# Patient Record
Sex: FEMALE | Race: OTHER | URBAN - METROPOLITAN AREA
[De-identification: names, ages, dates, MRNs, and addresses within clinical notes are randomized per-mention and may not be internally consistent; named-entity substitution may affect disease eponyms.]

---

## 2022-04-06 ENCOUNTER — NEW PATIENT (OUTPATIENT)
Dept: URBAN - METROPOLITAN AREA CLINIC 21 | Facility: CLINIC | Age: 82
End: 2022-04-06

## 2022-04-06 DIAGNOSIS — H40.023: ICD-10-CM

## 2022-04-06 DIAGNOSIS — H43.393: ICD-10-CM

## 2022-04-06 DIAGNOSIS — H52.4: ICD-10-CM

## 2022-04-06 DIAGNOSIS — H53.2: ICD-10-CM

## 2022-04-06 DIAGNOSIS — H04.123: ICD-10-CM

## 2022-04-06 DIAGNOSIS — Z96.1: ICD-10-CM

## 2022-04-06 PROCEDURE — 92004 COMPRE OPH EXAM NEW PT 1/>: CPT

## 2022-04-06 ASSESSMENT — TONOMETRY
OS_IOP_MMHG: 10
OD_IOP_MMHG: 12

## 2022-04-06 ASSESSMENT — VISUAL ACUITY
OS_CC: 20/40
OD_CC: 20/40
OU_CC: 20/30

## 2022-04-06 NOTE — PATIENT DISCUSSION
no improvement to clarity of vision/reduction in diplopia w/ MRx/prism today. will refer for second opinion w/ [facility] at [address]

## 2022-05-04 ENCOUNTER — DIAGNOSTICS ONLY (OUTPATIENT)
Dept: URBAN - METROPOLITAN AREA CLINIC 21 | Facility: CLINIC | Age: 82
End: 2022-05-04

## 2022-05-04 DIAGNOSIS — H53.2: ICD-10-CM

## 2022-05-04 DIAGNOSIS — H40.023: ICD-10-CM

## 2022-05-04 DIAGNOSIS — Z96.1: ICD-10-CM

## 2022-05-04 DIAGNOSIS — H04.123: ICD-10-CM

## 2022-05-04 DIAGNOSIS — H43.393: ICD-10-CM

## 2022-05-04 PROCEDURE — 92133 CPTRZD OPH DX IMG PST SGM ON: CPT

## 2022-05-04 PROCEDURE — 92083 EXTENDED VISUAL FIELD XM: CPT

## 2022-05-04 PROCEDURE — 76514 ECHO EXAM OF EYE THICKNESS: CPT

## 2022-05-04 PROCEDURE — 99211T TECH SERVICE

## 2022-05-04 NOTE — PATIENT DISCUSSION
no improvement to clarity of vision/reduction in diplopia w/ MRx/prism today. will refer for second opinion w/ [facility] specialist at [address]

## 2022-05-18 ENCOUNTER — FOLLOW UP (OUTPATIENT)
Dept: URBAN - METROPOLITAN AREA CLINIC 21 | Facility: CLINIC | Age: 82
End: 2022-05-18

## 2022-05-18 DIAGNOSIS — H40.1232: ICD-10-CM

## 2022-05-18 PROCEDURE — 76514 ECHO EXAM OF EYE THICKNESS: CPT

## 2022-05-18 PROCEDURE — 92133 CPTRZD OPH DX IMG PST SGM ON: CPT

## 2022-05-18 PROCEDURE — 92083 EXTENDED VISUAL FIELD XM: CPT

## 2022-05-18 PROCEDURE — 99213 OFFICE O/P EST LOW 20 MIN: CPT

## 2022-05-18 RX ORDER — BRIMONIDINE TARTRATE 2 MG/MG: 1 SOLUTION/ DROPS OPHTHALMIC TWICE A DAY

## 2022-05-18 ASSESSMENT — TONOMETRY
OD_IOP_MMHG: 12
OS_IOP_MMHG: 11

## 2022-05-18 ASSESSMENT — VISUAL ACUITY
OD_CC: 20/40
OS_CC: 20/40

## 2022-05-18 NOTE — PATIENT DISCUSSION
no improvement to clarity of vision/reduction in diplopia w/ MRx/prism today. will refer for second opinion w/ [facility] specialist at [address]

## 2022-06-15 ENCOUNTER — ESTABLISHED PATIENT (OUTPATIENT)
Dept: URBAN - METROPOLITAN AREA CLINIC 21 | Facility: CLINIC | Age: 82
End: 2022-06-15

## 2022-06-15 DIAGNOSIS — H40.1232: ICD-10-CM

## 2022-06-15 PROCEDURE — 99213 OFFICE O/P EST LOW 20 MIN: CPT

## 2022-06-15 ASSESSMENT — TONOMETRY
OD_IOP_MMHG: 14
OS_IOP_MMHG: 12
OS_IOP_MMHG: 13
OD_IOP_MMHG: 12

## 2022-06-15 ASSESSMENT — VISUAL ACUITY
OD_CC: 20/25-2
OS_CC: 20/40-1

## 2022-06-15 NOTE — PATIENT DISCUSSION
no improvement to clarity of vision/reduction in diplopia w/ MRx/prism today. will refer for second opinion w/ [facility] specialist at [address].

## 2023-04-12 ENCOUNTER — ESTABLISHED PATIENT (OUTPATIENT)
Dept: URBAN - METROPOLITAN AREA CLINIC 21 | Facility: CLINIC | Age: 83
End: 2023-04-12

## 2023-04-12 DIAGNOSIS — H04.123: ICD-10-CM

## 2023-04-12 DIAGNOSIS — H52.4: ICD-10-CM

## 2023-04-12 DIAGNOSIS — H53.2: ICD-10-CM

## 2023-04-12 DIAGNOSIS — H43.393: ICD-10-CM

## 2023-04-12 DIAGNOSIS — H40.1232: ICD-10-CM

## 2023-04-12 DIAGNOSIS — Z96.1: ICD-10-CM

## 2023-04-12 PROCEDURE — 99214 OFFICE O/P EST MOD 30 MIN: CPT

## 2023-04-12 PROCEDURE — 92015 DETERMINE REFRACTIVE STATE: CPT

## 2023-04-12 ASSESSMENT — VISUAL ACUITY
OD_SC: 20/40+1
OU_SC: 20/30
OS_SC: 20/40+1

## 2023-04-12 ASSESSMENT — TONOMETRY
OD_IOP_MMHG: 10
OS_IOP_MMHG: 12

## 2023-09-07 ENCOUNTER — ESTABLISHED PATIENT (OUTPATIENT)
Dept: URBAN - METROPOLITAN AREA CLINIC 20 | Facility: CLINIC | Age: 83
End: 2023-09-07

## 2023-09-07 DIAGNOSIS — H26.493: ICD-10-CM

## 2023-09-07 PROCEDURE — 66821 AFTER CATARACT LASER SURGERY: CPT

## 2023-09-07 PROCEDURE — 92014 COMPRE OPH EXAM EST PT 1/>: CPT

## 2023-09-07 RX ORDER — PREDNISOLONE ACETATE 10 MG/ML: 1 SUSPENSION/ DROPS OPHTHALMIC

## 2023-09-07 ASSESSMENT — VISUAL ACUITY
OD_SC: 20/40
OU_SC: 20/40
OS_SC: 20/40

## 2023-09-07 ASSESSMENT — TONOMETRY: OD_IOP_MMHG: 14

## 2023-09-11 ENCOUNTER — FOLLOW UP (OUTPATIENT)
Dept: URBAN - METROPOLITAN AREA CLINIC 20 | Facility: CLINIC | Age: 83
End: 2023-09-11

## 2023-09-11 DIAGNOSIS — H26.491: ICD-10-CM

## 2023-09-11 PROCEDURE — 99024 POSTOP FOLLOW-UP VISIT: CPT

## 2023-09-11 PROCEDURE — 66821 AFTER CATARACT LASER SURGERY: CPT

## 2023-09-11 ASSESSMENT — TONOMETRY
OS_IOP_MMHG: 13
OD_IOP_MMHG: 12

## 2023-09-11 ASSESSMENT — VISUAL ACUITY
OD_CC: 20/30+2
OS_CC: 20/25+2

## 2023-10-10 ENCOUNTER — FOLLOW UP (OUTPATIENT)
Dept: URBAN - METROPOLITAN AREA CLINIC 21 | Facility: CLINIC | Age: 83
End: 2023-10-10

## 2023-10-10 DIAGNOSIS — Z98.890: ICD-10-CM

## 2023-10-10 PROCEDURE — 99024 POSTOP FOLLOW-UP VISIT: CPT

## 2023-10-10 ASSESSMENT — VISUAL ACUITY
OD_SC: 20/40-1
OS_SC: 20/40

## 2023-10-10 ASSESSMENT — TONOMETRY
OS_IOP_MMHG: 9
OD_IOP_MMHG: 10